# Patient Record
(demographics unavailable — no encounter records)

---

## 2024-12-31 NOTE — REVIEW OF SYSTEMS
[TextEntry] : Constitutional: Denies fever, fatigue,  has some  recent changes in the weight( gain weight), c/o snoring Head: Denies  dizziness. C/o headache Eyes: Denies diplopia, tearing or pain Ears: Denies earache, tinnitus, hearing loss Nose: Denies nasal obstruction,  epistaxis Throat: Denies throat pain Neck: Denies stiffness, muscle tenderness Chest: Denies cough, SOB, wheezing, chest congestion CV: Denies chest pain, palpitation GI: Denies abdominal pain, constipation, heartburn Genitourinary: Denies dysuria, urinary urgency Musculoskeletal: Denies joint pain Neuro: Denies changes in mental status, C/o numbness sensation of the 3rd, 4rd and 5th finger on the L hand Psychiatric: Denies depressive symptoms, change in sleep habits, changes in thought content

## 2024-12-31 NOTE — HEALTH RISK ASSESSMENT
[Fair] : ~his/her~ current health as fair  [Good] : ~his/her~  mood as  good [Yes] : Yes [Monthly or less (1 pt)] : Monthly or less (1 point) [1 or 2 (0 pts)] : 1 or 2 (0 points) [Never (0 pts)] : Never (0 points) [No] : In the past 12 months have you used drugs other than those required for medical reasons? No [No falls in past year] : Patient reported no falls in the past year [Little interest or pleasure doing things] : 1) Little interest or pleasure doing things [Feeling down, depressed, or hopeless] : 2) Feeling down, depressed, or hopeless [0] : 2) Feeling down, depressed, or hopeless: Not at all (0) [PHQ-2 Negative - No further assessment needed] : PHQ-2 Negative - No further assessment needed [HIV Test offered] : HIV Test offered [Hepatitis C test offered] : Hepatitis C test offered [None] : None [With Family] : lives with family [# of Members in Household ___] :  household currently consist of [unfilled] member(s) [Employed] : employed [College] : College [Single] : single [# Of Children ___] : has [unfilled] children [Sexually Active] : sexually active [Feels Safe at Home] : Feels safe at home [Fully functional (bathing, dressing, toileting, transferring, walking, feeding)] : Fully functional (bathing, dressing, toileting, transferring, walking, feeding) [Fully functional (using the telephone, shopping, preparing meals, housekeeping, doing laundry, using] : Fully functional and needs no help or supervision to perform IADLs (using the telephone, shopping, preparing meals, housekeeping, doing laundry, using transportation, managing medications and managing finances) [Smoke Detector] : smoke detector [Carbon Monoxide Detector] : carbon monoxide detector [Safety elements used in home] : safety elements used in home [Seat Belt] :  uses seat belt [Patient/Caregiver not ready to engage] : , patient/caregiver not ready to engage [Current] : Current [10-14] : 10-14 [NO] : No [de-identified] : no [de-identified] : no [Audit-CScore] : 1 [de-identified] : no [de-identified] : regular diet [[id number]] : 0 [Change in mental status noted] : No change in mental status noted [Language] : denies difficulty with language [Behavior] : denies difficulty with behavior [Learning/Retaining New Information] : denies difficulty learning/retaining new information [Handling Complex Tasks] : denies difficulty handling complex tasks [High Risk Behavior] : no high risk behavior [Reports changes in hearing] : Reports no changes in hearing [Reports changes in vision] : Reports no changes in vision [Reports changes in dental health] : Reports no changes in dental health [Sunscreen] : does not use sunscreen [FreeTextEntry2] : works for an [employer] [de-identified] : start smoking at 23

## 2024-12-31 NOTE — PHYSICAL EXAM
[TextEntry] : Constitutional: Obese, well developed, well appearing, not in acute distress Head: Normocephalic, no lesions Eyes: PERRLA, conjunctiva is NL Ear: Ear canal is normal, tympanic membrane is intact Nose: Nasal turbinates are NL Throat: Clear, no exudates, no lesions Neck: Supple, no masses Chest: Lungs are clear, no rales, no rhonchi, no wheezing Heart: Regular rate, no murmurs, no rubs, no gallops Breast: b/l breast gynecomastia, axillary l/n is nl Abdomen: Soft, no tenderness, no masses, bowel sounds are normal : No CVAT Extremities: FROM, no deformities, no edema, has mild discomfort of the L medial epicondyle. Musculoskeletal: has no tenderness of the spine, ROM is nL Skin: No rashes, has an acne scar on the back. Has  several tattoos on the R arm Neuro: AAO x 3, no focal neurological deficit. Psychiatric: oriented to person, place, and time and insight and judgment were intact.

## 2024-12-31 NOTE — COUNSELING
[Fall prevention counseling provided] : Fall prevention counseling provided [Adequate lighting] : Adequate lighting [No throw rugs] : No throw rugs [Use proper foot wear] : Use proper foot wear [Yes] : Risk of tobacco use and health benefits of smoking cessation discussed: Yes [AUDIT-C Screening administered and reviewed] : AUDIT-C Screening administered and reviewed

## 2024-12-31 NOTE — HISTORY OF PRESENT ILLNESS
[FreeTextEntry1] : New patient [de-identified] : Mr. GLENROY DONIS 29 year  male  with  no significant PMH   present to the office for a physical exam and establish care with a new PCP.  Patient feels OK, c/o headache mostly in am when he wakes up. Admits that he snores.  Also c/o , numbness and both hands  since 12/25/24. As per patient drank hard liquor on a [place] went to bed, woke up with numbness sensation in both hands ,  chest pressure, headache. Currently has numbness of the 3rd, 4th and 5th finger on the L hand. Denies chest pain, sob, palpitations.

## 2025-01-30 NOTE — PLAN
HR 34 again.  Cardiology consult ordered by Dr. Bowen. Patient placed on cardiac monitor.  Pt awake, alert. Pt denies dizziness, pain, nausea.   [FreeTextEntry1] : Mr. GLENROY DONIS 29 year  male  with  no significant PMH   present to the office for a physical exam and establish care with a new PCP.  Well adult exam is performed. Recommend  to do a blood test today, further management will depend on the blood test results.  EKG was done and review, NSR 67 bpm, T inversion in lead III, will referred patient to see a cardiologist(patient is from [facility]) Urine dipstick is done - has some trace blood will send u/a to the lab For a numbness in the 3 fingers on the L hand ? CTS - recommend to f/u with neurologist, wear wrist brace Headache, snoring - f/u with a pulmonologist - needs to do a sleep study. Gynecomastia - do b/l breast mammogram Being overweight increases your risk of health conditions such as heart disease, high blood pressure, type 2 diabetes, and certain types of cancer. It can also increase your risk for osteoarthritis, sleep apnea, and other respiratory problems. Aim for a slow, steady weight loss. Even a small amount of weight loss can lower your risk of health problems.  RTC to f/u in 2 wks. Patient is verbalized understanding

## 2025-02-04 NOTE — DISCUSSION/SUMMARY
[FreeTextEntry1] : 29M with atypical chest pain that lasted 1 week after heavily drinking ETOH It is not clear why the symptoms would have persisted for so long after drinking He generally feels better at present but notes some chest tightness with exertion at work Pt is young but is of [place] descent where this is a higher risk of heart disease with a younger age of onset  Will arrange for transthoracic echo to ensure normal left ventricular size and function and normal valvular function. Will arrange for treadmill stress test to rule out ischemia and assess for arrhythmias  RV for cardiac testing

## 2025-02-04 NOTE — HISTORY OF PRESENT ILLNESS
[FreeTextEntry1] : 29M who presents for evaluation of cardiac symptoms  Pt drank heavily one night about 6 weeks ago That night, felt tightness in chest, numbness in L arm Symptoms lasted for over a week then resolved ECG done during that time period was normal Has felt well for the last month without recurrence  No formal exercise, very active at work.  Occasional chest tightness when active at work.  Trying to eat better, wants to exercise more, since this episode   Occasional smoker but quit since this episode in late Dec 2024. Denies family hx of CAD. Works in an [employer]  ECG: SR with TWI in III